# Patient Record
Sex: MALE | URBAN - METROPOLITAN AREA
[De-identification: names, ages, dates, MRNs, and addresses within clinical notes are randomized per-mention and may not be internally consistent; named-entity substitution may affect disease eponyms.]

---

## 2024-05-10 ENCOUNTER — NURSE TRIAGE (OUTPATIENT)
Dept: NURSING | Facility: CLINIC | Age: 34
End: 2024-05-10

## 2024-05-11 NOTE — TELEPHONE ENCOUNTER
"Triage Call:     Pt calling to report that he has had a sore throat for the last 10 days  Swollen painful area on the front of the throat  Pain rated: 4-5/10  He recalls feeling the sore throat come on when he was kissing a woman's neck. He thinks she had some soap on her neck that irritated her throat    Drinks alcohol and smokes marijuana; kniow    Disposition: See PCP within 3 days. Pt was given care advice and plans to go to the [facility] [place] [facility] tomorrow per his report.     Reason for Disposition   [1] Sore throat is the only symptom AND [2] present > 48 hours    Additional Information   Negative: SEVERE difficulty breathing (e.g., struggling for each breath, speaks in single words, stridor)   Negative: Sounds like a life-threatening emergency to the triager   Negative: Unable to open mouth completely   Negative: [1] Drooling or spitting out saliva (because can't swallow) AND [2] normal breathing   Negative: [1] Difficulty breathing AND [2] not severe   Negative: [1] Refuses to drink anything AND [2] for > 12 hours   Negative: Fever > 104 F (40 C)   Negative: [1] Drinking very little AND [2] dehydration suspected (e.g., no urine > 12 hours, very dry mouth, very lightheaded)   Negative: Patient sounds very sick or weak to the triager   Negative: SEVERE (e.g., excruciating) throat pain   Negative: [1] Pus on tonsils (back of throat) AND [2]  fever AND [3] swollen neck lymph nodes (\"glands\")   Negative: Fever present > 3 days (72 hours)   Negative: [1] Rash AND [2] widespread (especially chest and abdomen)   Negative: Earache also present   Negative: Diabetes mellitus or weak immune system (e.g., HIV positive, cancer chemo, splenectomy, organ transplant, chronic steroids)   Negative: History of rheumatic fever   Negative: [1] Adult is leaving on a trip AND [2] requests an antibiotic NOW   Negative: [1] Positive throat culture or rapid strep test (according to lab, PCP, caller, etc.) AND [2] NO  standing order to call in " prescription for antibiotic   Negative: [1] Exposure to family member (or spouse or boyfriend/girlfriend) with test-proven strep AND [2] within last 10 days    Protocols used: Sore Throat-A-  Aubrie Arceo RN  [facility] Nurse Advisor 8:59 PM 5/10/2024